# Patient Record
Sex: FEMALE | Race: WHITE | URBAN - METROPOLITAN AREA
[De-identification: names, ages, dates, MRNs, and addresses within clinical notes are randomized per-mention and may not be internally consistent; named-entity substitution may affect disease eponyms.]

---

## 2024-01-02 ENCOUNTER — APPOINTMENT (RX ONLY)
Dept: URBAN - METROPOLITAN AREA CLINIC 66 | Facility: CLINIC | Age: 39
Setting detail: DERMATOLOGY
End: 2024-01-02

## 2024-01-02 DIAGNOSIS — H61.03 CHONDRITIS OF EXTERNAL EAR: ICD-10-CM

## 2024-01-02 PROBLEM — H61.032 CHONDRITIS OF LEFT EXTERNAL EAR: Status: ACTIVE | Noted: 2024-01-02

## 2024-01-02 PROCEDURE — ? ADDITIONAL NOTES

## 2024-01-02 PROCEDURE — ? COUNSELING

## 2024-01-02 PROCEDURE — 11900 INJECT SKIN LESIONS </W 7: CPT

## 2024-01-02 PROCEDURE — ? FULL BODY SKIN EXAM - DECLINED

## 2024-01-02 PROCEDURE — ? INTRALESIONAL KENALOG

## 2024-01-02 ASSESSMENT — LOCATION SIMPLE DESCRIPTION DERM: LOCATION SIMPLE: LEFT EAR

## 2024-01-02 ASSESSMENT — LOCATION DETAILED DESCRIPTION DERM: LOCATION DETAILED: LEFT SUPERIOR HELIX

## 2024-01-02 ASSESSMENT — LOCATION ZONE DERM: LOCATION ZONE: EAR

## 2024-01-02 NOTE — PROCEDURE: INTRALESIONAL KENALOG
Require Ndc Code?: No
Medical Necessity Clause: This procedure was medically necessary because the lesions that were treated were:
X Size Of Lesion In Cm (Optional): 0
Lot # For Kenalog (Optional): [id number]
Validate Note Data When Using Inventory: Yes
Expiration Date For Kenalog (Optional): NOV 2024
Concentration Of Kenalog Solution Injected (Mg/Ml): 20.0
Kenalog Preparation: Kenalog
Total Volume (Ccs): 0.3
Administered By (Optional): Pam Blair PA-C
Consent: The risks of atrophy were reviewed with the patient.
Kenalog Type Of Vial: Multiple Dose
Detail Level: Detailed

## 2024-01-02 NOTE — PROCEDURE: FULL BODY SKIN EXAM - DECLINED
Price (Do Not Change): 0.00
Detail Level: Simple
Instructions: This plan will send the code FBSD to the PM system.  DO NOT or CHANGE the price.
PROVIDER:[TOKEN:[[id number]],FOLLOWUP:[1 week],ESTABLISHEDPATIENT:[T]],PROVIDER:[TOKEN:[[id number]],SCHEDULEDAPPT:[05/13/2021],SCHEDULEDAPPTTIME:[02:00 PM],ESTABLISHEDPATIENT:[T]],PROVIDER:[TOKEN:[[id number]],FOLLOWUP:[1 week],ESTABLISHEDPATIENT:[T]]

## 2024-01-02 NOTE — PROCEDURE: ADDITIONAL NOTES
Additional Notes: Shave removal vs ILK discussed today, patient opted for ILK . Will  come back as needed if treatment doesn’t work good for shave removal.
Render Risk Assessment In Note?: no
Detail Level: Detailed

## 2024-05-01 ENCOUNTER — APPOINTMENT (RX ONLY)
Dept: URBAN - METROPOLITAN AREA CLINIC 66 | Facility: CLINIC | Age: 39
Setting detail: DERMATOLOGY
End: 2024-05-01

## 2024-05-01 DIAGNOSIS — H61.03 CHONDRITIS OF EXTERNAL EAR: ICD-10-CM

## 2024-05-01 PROBLEM — H61.032 CHONDRITIS OF LEFT EXTERNAL EAR: Status: ACTIVE | Noted: 2024-05-01

## 2024-05-01 PROCEDURE — ? INTRALESIONAL KENALOG

## 2024-05-01 PROCEDURE — ? ADDITIONAL NOTES

## 2024-05-01 PROCEDURE — ? COUNSELING

## 2024-05-01 PROCEDURE — 11900 INJECT SKIN LESIONS </W 7: CPT

## 2024-05-01 ASSESSMENT — LOCATION ZONE DERM: LOCATION ZONE: EAR

## 2024-05-01 ASSESSMENT — LOCATION DETAILED DESCRIPTION DERM: LOCATION DETAILED: LEFT SUPERIOR HELIX

## 2024-05-01 ASSESSMENT — LOCATION SIMPLE DESCRIPTION DERM: LOCATION SIMPLE: LEFT EAR

## 2024-05-01 NOTE — PROCEDURE: INTRALESIONAL KENALOG
Require Ndc Code?: No
Medical Necessity Clause: This procedure was medically necessary because the lesions that were treated were:
X Size Of Lesion In Cm (Optional): 0
Lot # For Kenalog (Optional): [id number]
Validate Note Data When Using Inventory: Yes
Expiration Date For Kenalog (Optional): NOV 2024
Concentration Of Kenalog Solution Injected (Mg/Ml): 20.0
Kenalog Preparation: Kenalog
Total Volume (Ccs): 0.3
Administered By (Optional): Pam Blair PA-C
Which Kenalog Vial Was Used?: Kenalog 40 mg/ml (5 ml vial)
Consent: The risks of atrophy were reviewed with the patient.
Kenalog Type Of Vial: Multiple Dose
Detail Level: Detailed

## 2024-05-01 NOTE — PROCEDURE: ADDITIONAL NOTES
Additional Notes: Discussed with the patient how important getting a donut pillow is so she is not sleeping on the ear directly. Patient was notified that  first available appointment for a consult would not be until July. Patient is going to follow up with Ms. Blair in June to have ear rechecked.
Render Risk Assessment In Note?: no
Detail Level: Detailed

## 2024-08-21 ENCOUNTER — APPOINTMENT (RX ONLY)
Dept: URBAN - METROPOLITAN AREA CLINIC 66 | Facility: CLINIC | Age: 39
Setting detail: DERMATOLOGY
End: 2024-08-21

## 2024-08-21 DIAGNOSIS — H61.03 CHONDRITIS OF EXTERNAL EAR: ICD-10-CM

## 2024-08-21 PROBLEM — H61.032 CHONDRITIS OF LEFT EXTERNAL EAR: Status: ACTIVE | Noted: 2024-08-21

## 2024-08-21 PROCEDURE — ? ADDITIONAL NOTES

## 2024-08-21 PROCEDURE — ? COUNSELING

## 2024-08-21 PROCEDURE — 99212 OFFICE O/P EST SF 10 MIN: CPT

## 2024-08-21 ASSESSMENT — LOCATION DETAILED DESCRIPTION DERM: LOCATION DETAILED: LEFT SUPERIOR HELIX

## 2024-08-21 ASSESSMENT — LOCATION SIMPLE DESCRIPTION DERM: LOCATION SIMPLE: LEFT EAR

## 2024-08-21 ASSESSMENT — LOCATION ZONE DERM: LOCATION ZONE: EAR

## 2024-08-21 NOTE — PROCEDURE: ADDITIONAL NOTES
Render Risk Assessment In Note?: no
Detail Level: Detailed
Additional Notes: Patient scheduled for 10/11/24 with Jason Iniguez for Consultation/ Possible Biopsy.

## 2024-10-11 ENCOUNTER — APPOINTMENT (RX ONLY)
Dept: URBAN - METROPOLITAN AREA CLINIC 66 | Facility: CLINIC | Age: 39
Setting detail: DERMATOLOGY
End: 2024-10-11

## 2024-10-11 DIAGNOSIS — H61.03 CHONDRITIS OF EXTERNAL EAR: ICD-10-CM

## 2024-10-11 PROBLEM — H61.032 CHONDRITIS OF LEFT EXTERNAL EAR: Status: ACTIVE | Noted: 2024-10-11

## 2024-10-11 PROCEDURE — 99213 OFFICE O/P EST LOW 20 MIN: CPT

## 2024-10-11 PROCEDURE — ? CONSULTATION FOR EXCISION

## 2024-10-11 ASSESSMENT — LOCATION DETAILED DESCRIPTION DERM: LOCATION DETAILED: LEFT SUPERIOR HELIX

## 2024-10-11 ASSESSMENT — LOCATION ZONE DERM: LOCATION ZONE: EAR

## 2024-10-11 ASSESSMENT — LOCATION SIMPLE DESCRIPTION DERM: LOCATION SIMPLE: LEFT EAR

## 2024-10-11 NOTE — PROCEDURE: CONSULTATION FOR EXCISION OF BENIGN LESIONS
Detail Level: Detailed
Size Of Lesion: 0.5
X Size Of Lesion In Cm (Optional): 0
Anatomic Location From Referring Provider: left superior helix
Other Plan: Dr. Iniguez explained that surgery was not in her best interest and doing so would make it worse.

## 2024-10-11 NOTE — HPI: SURGICAL CONSULTATION
What Is The Location Of The Lesion?: Left superior helix
Who Is Your Referring Physician?: Pam Blair

## 2025-07-01 ENCOUNTER — APPOINTMENT (OUTPATIENT)
Dept: URBAN - METROPOLITAN AREA CLINIC 66 | Facility: CLINIC | Age: 40
Setting detail: DERMATOLOGY
End: 2025-07-01

## 2025-07-01 DIAGNOSIS — Z41.9 ENCOUNTER FOR PROCEDURE FOR PURPOSES OTHER THAN REMEDYING HEALTH STATE, UNSPECIFIED: ICD-10-CM

## 2025-07-01 PROCEDURE — ? ADDITIONAL NOTES

## 2025-07-01 PROCEDURE — ? DAXXIFY (U OR CC)

## 2025-07-01 PROCEDURE — ? COSMETIC CONSULTATION: DAXXIFY

## 2025-07-01 PROCEDURE — ? COSMETIC CONSULTATION: BOTULINUM TOXIN

## 2025-07-01 ASSESSMENT — LOCATION DETAILED DESCRIPTION DERM
LOCATION DETAILED: LEFT MEDIAL FOREHEAD
LOCATION DETAILED: RIGHT INFERIOR MEDIAL FOREHEAD
LOCATION DETAILED: LEFT FOREHEAD
LOCATION DETAILED: LEFT SUPERIOR FOREHEAD
LOCATION DETAILED: RIGHT SUPERIOR CENTRAL MALAR CHEEK
LOCATION DETAILED: LEFT SUPERIOR MEDIAL FOREHEAD
LOCATION DETAILED: LEFT LATERAL CANTHUS
LOCATION DETAILED: LEFT INFERIOR FOREHEAD
LOCATION DETAILED: RIGHT FOREHEAD
LOCATION DETAILED: LEFT SUPERIOR CENTRAL MALAR CHEEK
LOCATION DETAILED: LEFT CENTRAL EYEBROW
LOCATION DETAILED: RIGHT INFERIOR TEMPLE
LOCATION DETAILED: RIGHT LATERAL FOREHEAD
LOCATION DETAILED: RIGHT SUPERIOR FOREHEAD
LOCATION DETAILED: LEFT INFERIOR LATERAL FOREHEAD
LOCATION DETAILED: LEFT LATERAL FOREHEAD
LOCATION DETAILED: GLABELLA

## 2025-07-01 ASSESSMENT — LOCATION SIMPLE DESCRIPTION DERM
LOCATION SIMPLE: RIGHT FOREHEAD
LOCATION SIMPLE: LEFT CHEEK
LOCATION SIMPLE: GLABELLA
LOCATION SIMPLE: LEFT EYELID
LOCATION SIMPLE: RIGHT CHEEK
LOCATION SIMPLE: RIGHT TEMPLE
LOCATION SIMPLE: LEFT EYEBROW
LOCATION SIMPLE: LEFT FOREHEAD

## 2025-07-01 ASSESSMENT — LOCATION ZONE DERM
LOCATION ZONE: FACE
LOCATION ZONE: EYELID

## 2025-07-01 NOTE — PROCEDURE: DAXXIFY (U OR CC)
Inferior Lateral Orbicularis Oculi Units: 1
Price (Use Numbers Only, No Special Characters Or $): 856
Reconstitution Date (Optional): 36units/ 72 units 6 units from box 5  30 units from box 6
Additional Area 2 Units: 0
Forehead Units/Cc: 10
Glabellar Complex Units: 11
Document As Units Or Cc?: units
Including Pricing Information In The Note: No
Detail Level: Detailed
Consent: Written consent obtained. Risks include but not limited to lid/brow ptosis, bruising, swelling, diplopia, temporary effect, incomplete chemical denervation.
Dilution (U/0.1 Cc): 4
Periorbital Skin Units/Cc: 14
Post-Care Instructions: Patient instructed to not lie down for 4 hours and limit physical activity for 24 hours.
Quantity Per Injection Site (Units Or Cc): 3 U
Quantity Per Injection Site (Units Or Cc): 1 U
Quantity Per Injection Site (Units Or Cc): 2 U
Quantity Per Injection Site (Units Or Cc): 4 U

## 2025-07-15 ENCOUNTER — APPOINTMENT (OUTPATIENT)
Dept: URBAN - METROPOLITAN AREA CLINIC 66 | Facility: CLINIC | Age: 40
Setting detail: DERMATOLOGY
End: 2025-07-15

## 2025-07-15 DIAGNOSIS — Z41.9 ENCOUNTER FOR PROCEDURE FOR PURPOSES OTHER THAN REMEDYING HEALTH STATE, UNSPECIFIED: ICD-10-CM

## 2025-07-15 PROCEDURE — ? DAXXIFY

## 2025-07-15 ASSESSMENT — LOCATION DETAILED DESCRIPTION DERM
LOCATION DETAILED: RIGHT INFERIOR LATERAL FOREHEAD
LOCATION DETAILED: LEFT LATERAL FOREHEAD

## 2025-07-15 ASSESSMENT — LOCATION ZONE DERM: LOCATION ZONE: FACE

## 2025-07-15 ASSESSMENT — LOCATION SIMPLE DESCRIPTION DERM
LOCATION SIMPLE: LEFT FOREHEAD
LOCATION SIMPLE: RIGHT FOREHEAD

## 2025-07-15 NOTE — PROCEDURE: DAXXIFY
Show Topical Anesthesia: Yes
Left Periorbital Skin Units: 0
Expiration Date (Month Year): 2026/04
Forehead Units: 2
Show Ucl Units: No
Bill Summary Price Listed Below, Or Bill Total Of Units X Price Per Unit?: Bill Summary Price Below
Detail Level: Detailed
Lot #: [id number]
Dilution (U/0.1 Cc): 4
Consent: Written consent obtained. Risks include but not limited to lid/brow ptosis, bruising, swelling, diplopia, temporary effect, incomplete chemical denervation.
Comments: Touch up. Box 7. 4 total daxxify units
Post-Care Instructions: Patient instructed to not lie down for 4 hours and limit physical activity for 24 hours.